# Patient Record
Sex: FEMALE | Race: OTHER | URBAN - METROPOLITAN AREA
[De-identification: names, ages, dates, MRNs, and addresses within clinical notes are randomized per-mention and may not be internally consistent; named-entity substitution may affect disease eponyms.]

---

## 2022-08-09 ENCOUNTER — EMERGENCY (EMERGENCY)
Facility: HOSPITAL | Age: 42
LOS: 1 days | Discharge: HOME | End: 2022-08-09
Admitting: EMERGENCY MEDICINE

## 2022-08-09 VITALS
HEART RATE: 74 BPM | SYSTOLIC BLOOD PRESSURE: 148 MMHG | TEMPERATURE: 98 F | DIASTOLIC BLOOD PRESSURE: 89 MMHG | RESPIRATION RATE: 16 BRPM | OXYGEN SATURATION: 100 %

## 2022-08-09 VITALS
HEIGHT: 66 IN | RESPIRATION RATE: 18 BRPM | SYSTOLIC BLOOD PRESSURE: 143 MMHG | TEMPERATURE: 98 F | WEIGHT: 179.9 LBS | OXYGEN SATURATION: 100 % | HEART RATE: 79 BPM | DIASTOLIC BLOOD PRESSURE: 83 MMHG

## 2022-08-09 DIAGNOSIS — R19.7 DIARRHEA, UNSPECIFIED: ICD-10-CM

## 2022-08-09 DIAGNOSIS — R10.30 LOWER ABDOMINAL PAIN, UNSPECIFIED: ICD-10-CM

## 2022-08-09 DIAGNOSIS — Z91.013 ALLERGY TO SEAFOOD: ICD-10-CM

## 2022-08-09 DIAGNOSIS — R55 SYNCOPE AND COLLAPSE: ICD-10-CM

## 2022-08-09 LAB
ALBUMIN SERPL ELPH-MCNC: 3.6 G/DL — SIGNIFICANT CHANGE UP (ref 3.4–5)
ALP SERPL-CCNC: 46 U/L — SIGNIFICANT CHANGE UP (ref 40–120)
ALT FLD-CCNC: 27 U/L — SIGNIFICANT CHANGE UP (ref 12–42)
ANION GAP SERPL CALC-SCNC: 7 MMOL/L — LOW (ref 9–16)
APPEARANCE UR: CLEAR — SIGNIFICANT CHANGE UP
AST SERPL-CCNC: 15 U/L — SIGNIFICANT CHANGE UP (ref 15–37)
BASOPHILS # BLD AUTO: 0.02 K/UL — SIGNIFICANT CHANGE UP (ref 0–0.2)
BASOPHILS NFR BLD AUTO: 0.4 % — SIGNIFICANT CHANGE UP (ref 0–2)
BILIRUB SERPL-MCNC: 0.2 MG/DL — SIGNIFICANT CHANGE UP (ref 0.2–1.2)
BILIRUB UR-MCNC: NEGATIVE — SIGNIFICANT CHANGE UP
BUN SERPL-MCNC: 8 MG/DL — SIGNIFICANT CHANGE UP (ref 7–23)
CALCIUM SERPL-MCNC: 9.1 MG/DL — SIGNIFICANT CHANGE UP (ref 8.5–10.5)
CHLORIDE SERPL-SCNC: 107 MMOL/L — SIGNIFICANT CHANGE UP (ref 96–108)
CO2 SERPL-SCNC: 29 MMOL/L — SIGNIFICANT CHANGE UP (ref 22–31)
COLOR SPEC: YELLOW — SIGNIFICANT CHANGE UP
CREAT SERPL-MCNC: 1 MG/DL — SIGNIFICANT CHANGE UP (ref 0.5–1.3)
DIFF PNL FLD: ABNORMAL
EGFR: 72 ML/MIN/1.73M2 — SIGNIFICANT CHANGE UP
EOSINOPHIL # BLD AUTO: 0.04 K/UL — SIGNIFICANT CHANGE UP (ref 0–0.5)
EOSINOPHIL NFR BLD AUTO: 0.8 % — SIGNIFICANT CHANGE UP (ref 0–6)
GLUCOSE SERPL-MCNC: 100 MG/DL — HIGH (ref 70–99)
GLUCOSE UR QL: NEGATIVE — SIGNIFICANT CHANGE UP
HCG SERPL-ACNC: 55 MIU/ML — HIGH
HCG UR QL: POSITIVE — SIGNIFICANT CHANGE UP
HCT VFR BLD CALC: 38 % — SIGNIFICANT CHANGE UP (ref 34.5–45)
HGB BLD-MCNC: 12.5 G/DL — SIGNIFICANT CHANGE UP (ref 11.5–15.5)
IMM GRANULOCYTES NFR BLD AUTO: 0.2 % — SIGNIFICANT CHANGE UP (ref 0–1.5)
KETONES UR-MCNC: NEGATIVE — SIGNIFICANT CHANGE UP
LEUKOCYTE ESTERASE UR-ACNC: NEGATIVE — SIGNIFICANT CHANGE UP
LYMPHOCYTES # BLD AUTO: 1.29 K/UL — SIGNIFICANT CHANGE UP (ref 1–3.3)
LYMPHOCYTES # BLD AUTO: 24.4 % — SIGNIFICANT CHANGE UP (ref 13–44)
MCHC RBC-ENTMCNC: 28.6 PG — SIGNIFICANT CHANGE UP (ref 27–34)
MCHC RBC-ENTMCNC: 32.9 GM/DL — SIGNIFICANT CHANGE UP (ref 32–36)
MCV RBC AUTO: 87 FL — SIGNIFICANT CHANGE UP (ref 80–100)
MONOCYTES # BLD AUTO: 0.33 K/UL — SIGNIFICANT CHANGE UP (ref 0–0.9)
MONOCYTES NFR BLD AUTO: 6.3 % — SIGNIFICANT CHANGE UP (ref 2–14)
NEUTROPHILS # BLD AUTO: 3.59 K/UL — SIGNIFICANT CHANGE UP (ref 1.8–7.4)
NEUTROPHILS NFR BLD AUTO: 67.9 % — SIGNIFICANT CHANGE UP (ref 43–77)
NITRITE UR-MCNC: NEGATIVE — SIGNIFICANT CHANGE UP
NRBC # BLD: 0 /100 WBCS — SIGNIFICANT CHANGE UP (ref 0–0)
PH UR: 7 — SIGNIFICANT CHANGE UP (ref 5–8)
PLATELET # BLD AUTO: 226 K/UL — SIGNIFICANT CHANGE UP (ref 150–400)
POTASSIUM SERPL-MCNC: 3.8 MMOL/L — SIGNIFICANT CHANGE UP (ref 3.5–5.3)
POTASSIUM SERPL-SCNC: 3.8 MMOL/L — SIGNIFICANT CHANGE UP (ref 3.5–5.3)
PROT SERPL-MCNC: 7.2 G/DL — SIGNIFICANT CHANGE UP (ref 6.4–8.2)
PROT UR-MCNC: NEGATIVE MG/DL — SIGNIFICANT CHANGE UP
RBC # BLD: 4.37 M/UL — SIGNIFICANT CHANGE UP (ref 3.8–5.2)
RBC # FLD: 13.4 % — SIGNIFICANT CHANGE UP (ref 10.3–14.5)
RBC CASTS # UR COMP ASSIST: ABNORMAL /HPF
SODIUM SERPL-SCNC: 143 MMOL/L — SIGNIFICANT CHANGE UP (ref 132–145)
SP GR SPEC: 1.01 — SIGNIFICANT CHANGE UP (ref 1–1.03)
UROBILINOGEN FLD QL: 0.2 E.U./DL — SIGNIFICANT CHANGE UP
WBC # BLD: 5.28 K/UL — SIGNIFICANT CHANGE UP (ref 3.8–10.5)
WBC # FLD AUTO: 5.28 K/UL — SIGNIFICANT CHANGE UP (ref 3.8–10.5)
WBC UR QL: < 5 /HPF — SIGNIFICANT CHANGE UP

## 2022-08-09 PROCEDURE — 76830 TRANSVAGINAL US NON-OB: CPT | Mod: 26

## 2022-08-09 PROCEDURE — 99285 EMERGENCY DEPT VISIT HI MDM: CPT

## 2022-08-09 PROCEDURE — 76856 US EXAM PELVIC COMPLETE: CPT | Mod: 26

## 2022-08-09 NOTE — ED PROVIDER NOTE - CLINICAL SUMMARY MEDICAL DECISION MAKING FREE TEXT BOX
Patient PMHX RA,  LMP 22 s/p elective termination with misoprostol 6 days ago presents with persistant lower abdominal pain, dizziness and diarrhea that started today sent in from Urgent Care for evaluation. labs and US WNL. advised follow up with GYN for repeat Bhcg. Patient has a scheduled appointment on 2022

## 2022-08-09 NOTE — ED PROVIDER NOTE - PATIENT PORTAL LINK FT
You can access the FollowMyHealth Patient Portal offered by [facility] by registering at the following website: [email or web address]. By joining [email or web address]’s FollowMyHealth portal, you will also be able to view your health information using other applications (apps) compatible with our system.

## 2022-08-09 NOTE — ED ADULT TRIAGE NOTE - CHIEF COMPLAINT QUOTE
reports intermittent lower abdominal pain, n/v/d s/p taking misoprostol for elective - was sent from  for high blood pressure and tachycardia

## 2022-08-09 NOTE — ED PROVIDER NOTE - OBJECTIVE STATEMENT
PMHX RA,  LMP 22 presents with lower abdominal cramping since staking misoprostol for elective termination 6 days ago. admits to initial vaginal bleeding with quarter sized clots on the first two days using 8 pads per day, now only using 2 panty liners daily.  nausea, diarrhea, dizziness started today. patient sent from urgent care for evaluation for elevated BP and tachycardia in urgent care. has appointment with GYN on 2022. BP at triage 143/83 HR 79.

## 2023-02-13 NOTE — ED ADULT NURSE NOTE - DOES PATIENT HAVE ADVANCE DIRECTIVE
Problem: INFECTION - ADULT  Goal: Absence or prevention of progression during hospitalization  Description: INTERVENTIONS:  Picc procedure and maintenance  - Assess and monitor for signs and symptoms of infection  - Monitor lab/diagnostic results  - Monitor all insertion sites, i e  indwelling lines, tubes, and drains  - Monitor endotracheal if appropriate and nasal secretions for changes in amount and color  - Aladdin appropriate cooling/warming therapies per order  - Administer medications as ordered  - Instruct and encourage patient and family to use good hand hygiene technique  - Identify and instruct in appropriate isolation precautions for identified infection/condition  Outcome: Progressing     Problem: Knowledge Deficit  Goal: Patient/family/caregiver demonstrates understanding of disease process, treatment plan, medications, and discharge instructions  Description: Complete learning assessment and assess knowledge base    Interventions:  Picc procedure and maintenance  - Provide teaching at level of understanding  - Provide teaching via preferred learning methods  Outcome: Progressing No

## 2023-07-24 ENCOUNTER — EMERGENCY (EMERGENCY)
Facility: HOSPITAL | Age: 43
LOS: 1 days | Discharge: HOME | End: 2023-07-24
Admitting: EMERGENCY MEDICINE
Payer: MEDICAID

## 2023-07-24 VITALS
RESPIRATION RATE: 15 BRPM | WEIGHT: 169.98 LBS | OXYGEN SATURATION: 97 % | HEIGHT: 63 IN | DIASTOLIC BLOOD PRESSURE: 82 MMHG | SYSTOLIC BLOOD PRESSURE: 128 MMHG | HEART RATE: 68 BPM | TEMPERATURE: 98 F

## 2023-07-24 DIAGNOSIS — N76.0 ACUTE VAGINITIS: ICD-10-CM

## 2023-07-24 DIAGNOSIS — Z91.013 ALLERGY TO SEAFOOD: ICD-10-CM

## 2023-07-24 DIAGNOSIS — N89.8 OTHER SPECIFIED NONINFLAMMATORY DISORDERS OF VAGINA: ICD-10-CM

## 2023-07-24 DIAGNOSIS — Z11.3 ENCOUNTER FOR SCREENING FOR INFECTIONS WITH A PREDOMINANTLY SEXUAL MODE OF TRANSMISSION: ICD-10-CM

## 2023-07-24 LAB
APPEARANCE UR: CLEAR — SIGNIFICANT CHANGE UP
BACTERIA # UR AUTO: ABNORMAL /HPF
BILIRUB UR-MCNC: NEGATIVE — SIGNIFICANT CHANGE UP
COD CRY URNS QL: SIGNIFICANT CHANGE UP
COLOR SPEC: YELLOW — SIGNIFICANT CHANGE UP
DIFF PNL FLD: NEGATIVE — SIGNIFICANT CHANGE UP
EPI CELLS # UR: 4 — SIGNIFICANT CHANGE UP
GLUCOSE UR QL: NEGATIVE MG/DL — SIGNIFICANT CHANGE UP
GRAN CASTS # UR COMP ASSIST: SIGNIFICANT CHANGE UP
HCG UR QL: NEGATIVE — SIGNIFICANT CHANGE UP
HIV 1 & 2 AB SERPL IA.RAPID: SIGNIFICANT CHANGE UP
HYALINE CASTS # UR AUTO: SIGNIFICANT CHANGE UP
KETONES UR-MCNC: 15 MG/DL
LEUKOCYTE ESTERASE UR-ACNC: ABNORMAL
NITRITE UR-MCNC: NEGATIVE — SIGNIFICANT CHANGE UP
PH UR: 6.5 — SIGNIFICANT CHANGE UP (ref 5–8)
PROT UR-MCNC: NEGATIVE MG/DL — SIGNIFICANT CHANGE UP
RBC CASTS # UR COMP ASSIST: 0 /HPF — SIGNIFICANT CHANGE UP (ref 0–4)
SP GR SPEC: 1.01 — SIGNIFICANT CHANGE UP (ref 1–1.03)
TRI-PHOS CRY UR QL COMP ASSIST: SIGNIFICANT CHANGE UP
URATE CRY FLD QL MICRO: SIGNIFICANT CHANGE UP
UROBILINOGEN FLD QL: 0.2 MG/DL — SIGNIFICANT CHANGE UP (ref 0.2–1)
WBC UR QL: 1 /HPF — SIGNIFICANT CHANGE UP (ref 0–5)

## 2023-07-24 PROCEDURE — 99284 EMERGENCY DEPT VISIT MOD MDM: CPT

## 2023-07-24 RX ORDER — CEFTRIAXONE 500 MG/1
500 INJECTION, POWDER, FOR SOLUTION INTRAMUSCULAR; INTRAVENOUS ONCE
Refills: 0 | Status: COMPLETED | OUTPATIENT
Start: 2023-07-24 | End: 2023-07-24

## 2023-07-24 RX ORDER — METRONIDAZOLE 7.5 MG/G
1 GEL VAGINAL
Qty: 1 | Refills: 0
Start: 2023-07-24 | End: 2023-07-30

## 2023-07-24 RX ORDER — PENICILLIN G BENZATHINE 1200000 [IU]/2ML
2.4 INJECTION, SUSPENSION INTRAMUSCULAR ONCE
Refills: 0 | Status: COMPLETED | OUTPATIENT
Start: 2023-07-24 | End: 2023-07-24

## 2023-07-24 RX ORDER — METRONIDAZOLE 7.5 MG/G
1 GEL VAGINAL
Refills: 0
Start: 2023-07-24

## 2023-07-24 RX ORDER — METRONIDAZOLE 500 MG
1 TABLET ORAL
Qty: 14 | Refills: 0
Start: 2023-07-24 | End: 2023-07-30

## 2023-07-24 RX ADMIN — CEFTRIAXONE 500 MILLIGRAM(S): 500 INJECTION, POWDER, FOR SOLUTION INTRAMUSCULAR; INTRAVENOUS at 18:35

## 2023-07-24 RX ADMIN — Medication 100 MILLIGRAM(S): at 18:35

## 2023-07-24 NOTE — ED PROVIDER NOTE - OBJECTIVE STATEMENT
42-year-old female, medical history, presents emerged from for suprapubic pain, malodorous vaginal discharge, and requesting STD testing.  Patient was sexually active, with one male partner.  States that she has had a history of gonorrhea and chlamydia that was treated when she was 15 years old.  Requesting HIV and syphilis testing as she is unsure if her partner has been faithful in.  Currently complaining of malodorous vaginal discharge that is white.  Has tried boric oil without relief of symptoms.

## 2023-07-24 NOTE — ED PROVIDER NOTE - PHYSICAL EXAMINATION
General: well developed, well nourished, no distress  Eye: bilateral: PERRL, EOMI  Ears, Nose, Throat: normal pharynx, TMs normal, membranes moist.  Neck: non-tender, full range of motion, supple.  Negative For: lymphadenopathy (R), lymphadenopathy (L)  Respiratory: CTAB.  Cardiovascular: S1-S2 normal, regular rate, regular rhythm.  Abdomen: normal bowel sounds, non tender, soft.    Genitourinary: Negative For: CVA tenderness  Musculoskeletal: normal gait.  Negative For: back pain, upper, back pain  Extremities: normal range of motion, non-tender.  Negative For: edema (R), edema (L), calf tenderness (R), calf tenderness (L), swelling  Extremity Strength: upper extremities equal bilateral: 5/5, lower extremities equal bilateral: 5/5  Neurologic: alert, oriented to person, oriented to place, oriented to time.    Skin: normal color.  Negative For: rash  Psychiatric: normal affect, normal insight, normal concentration

## 2023-07-24 NOTE — ED PROVIDER NOTE - PATIENT PORTAL LINK FT
You can access the FollowMyHealth Patient Portal offered by [facility] by registering at the following website: [email or web address]. By joining [employer]’s FollowMyHealth portal, you will also be able to view your health information using other applications (apps) compatible with our system.

## 2023-07-24 NOTE — ED PROVIDER NOTE - CLINICAL SUMMARY MEDICAL DECISION MAKING FREE TEXT BOX
42-year-old female presents this emergency department with malodorous vaginal discharge and suprapubic pain  No fevers, CMT, adnexal tenderness noted - PID unlikely  UA and UCG ordered  Likely BV - will order Flagyl 500 mg BID x 7 days  Patient is also requesting STD testing.  Gonorrhea, chlamydia, syphilis, and HIV testing ordered  Patient agreed to be treated prophylactically.  We will await UA and UCG

## 2023-07-24 NOTE — ED PROVIDER NOTE - NSFOLLOWUPINSTRUCTIONS_ED_ALL_ED_FT
Preventing Sexually Transmitted Infections, Adult  Sexually transmitted infections (STIs) are spread from person to person (are contagious). They are spread, or transmitted, through bodily fluids exchanged during sex or sexual contact. These bodily fluids include saliva, semen, blood, vaginal mucus, and urine. STIs are very common and can occur among people of all ages.    Some common STIs include:  Herpes.  Hepatitis B.  Chlamydia.  Gonorrhea.  Syphilis.  HPV (human papillomavirus).  HIV, also called the human immunodeficiency virus. This is the virus that can cause AIDS (acquired immunodeficiency syndrome).  Often, people who have these STIs do not have symptoms. Even without symptoms, these infections can be spread from person to person and require treatment.    How can these conditions affect me?  STIs can be treated, and many STIs can be cured. However, some STIs cannot be cured and will affect you for the rest of your life.    Certain STIs may:  Require you to take medicine for the rest of your life.  Affect your ability to have children (your fertility).  Increase your risk for developing another STI or certain serious health conditions. These may include:  Cervical cancer.  Head and neck cancer.  Pelvic inflammatory disease (PID), in women.  Organ damage or damage to other parts of your body, if the infection spreads.  Cause problems during pregnancy and may be transmitted to the baby during the pregnancy or childbirth.  What can increase my risk?  You may have an increased risk for developing an STI if:  You have unprotected sex or sexual contact.  You have more than one sex partner.  You have a sex partner who has multiple sex partners.  You have sexual contact with someone who has an STI.  You have an STI or you had an STI before.  You inject drugs or have a sex partner or partners who inject drugs.  What actions can I take to prevent STIs?  The only way to completely prevent STIs is not to have sex of any kind. This is called practicing abstinence. If you are sexually active, you can protect yourself and others by taking these actions to lower your risk of getting an STI:    Lifestyle    Avoid mixing alcohol, drugs, and sex. Alcohol and drug use can affect your ability to make good decisions and can lead to risky sexual behaviors.    Medicines    Ask your health care provider about taking pre-exposure prophylaxis (PrEP) to prevent HIV infection.    General information    A person showing the correct way to put on a male condom.  Stay up to date on vaccinations. Certain vaccines can lower your risk of getting certain STIs, such as:  Hepatitis A and hepatitis B vaccines.  HPV (human papillomavirus) vaccine.  Have only one sex partner (be monogamous) or limit the number of sex partners you have.  Use methods that prevent the exchange of body fluids between partners (barrier protection) correctly every time you have sexual contact. Barrier protection can be used during oral, vaginal, or anal sex. Commonly used barrier methods include:  External (male) condom.  Internal (female) condom.  Dental dam.  Use a new barrier method for every sex act from start to finish.  Get tested for STIs. Have your partners get tested, too.  If you test positive for an STI, follow recommendations from your health care provider about treatment and make sure your sex partners are tested and treated.  Birth control pills, injections, implants, and intrauterine devices (IUDs) do not protect against STIs. To prevent both STIs and pregnancy, always use a condom with another form of birth control.  Some STIs, such as herpes, are spread through skin-to-skin contact. A barrier method may not protect you from getting such STIs. Avoid all sexual contact if you or your partners have herpes and there is an active flare with open sores.  Where to find more information  Learn more about STIs from:  Centers for Disease Control and Prevention:  More information about specific STIs: cdc.gov/std  Places to get sexual health counseling and treatment for free or at a low cost: gettested.cdc.gov  [place] Department of Health and Human Services: www.womenshealth.gov  Summary  Sexually transmitted infections (STIs) can spread through exchanging bodily fluids during sexual contact. Fluids include saliva, semen, blood, vaginal mucus, and urine.  You may have an increased risk for developing an STI if you have unprotected sex.  If you do have sex, limit your number of sex partners and use barrier protection every time you have sex.  This information is not intended to replace advice given to you by your health care provider. Make sure you discuss any questions you have with your health care provider.    Document Revised: 12/12/2022 Document Reviewed: 02/01/2021

## 2023-07-24 NOTE — ED PROVIDER NOTE - CARE PLAN
1 Principal Discharge DX:	Bacterial vaginitis  Secondary Diagnosis:	Screening for STD (sexually transmitted disease)

## 2023-07-24 NOTE — ED PROVIDER NOTE - PROGRESS NOTE DETAILS
UA and UCG is negative  Pelvic exam:  External exam: No masses, lesions, discharge noted  Internal exam: Vaginal wall pink, moist, with malodorous discharge appreciated. Cervix visualized, no abnormalitis  Bimanual Exam: No CMT, adnexal tenderness noted  Pt is sitting comfortably in room, no acute distress  Pt is stable for discharge  All results reviewed with pt. Pt understands and agrees with plan. Agreed to follow up with pcp in 2-3 days.

## 2023-07-25 PROBLEM — Z78.9 OTHER SPECIFIED HEALTH STATUS: Chronic | Status: ACTIVE | Noted: 2022-08-09

## 2023-07-25 LAB
C TRACH RRNA SPEC QL NAA+PROBE: SIGNIFICANT CHANGE UP
N GONORRHOEA RRNA SPEC QL NAA+PROBE: SIGNIFICANT CHANGE UP
SPECIMEN SOURCE: SIGNIFICANT CHANGE UP
T PALLIDUM AB TITR SER: NEGATIVE — SIGNIFICANT CHANGE UP